# Patient Record
Sex: MALE | Race: WHITE | Employment: FULL TIME | URBAN - METROPOLITAN AREA
[De-identification: names, ages, dates, MRNs, and addresses within clinical notes are randomized per-mention and may not be internally consistent; named-entity substitution may affect disease eponyms.]

---

## 2023-11-06 ENCOUNTER — HOSPITAL ENCOUNTER (INPATIENT)
Facility: HOSPITAL | Age: 23
LOS: 3 days | Discharge: HOME | End: 2023-11-10
Attending: EMERGENCY MEDICINE | Admitting: PSYCHIATRY & NEUROLOGY
Payer: COMMERCIAL

## 2023-11-06 DIAGNOSIS — R45.851 SUICIDAL IDEATION: ICD-10-CM

## 2023-11-06 DIAGNOSIS — T14.91XA SUICIDE ATTEMPT (HCC): Primary | ICD-10-CM

## 2023-11-06 DIAGNOSIS — T50.902A INTENTIONAL DRUG OVERDOSE, INITIAL ENCOUNTER (HCC): ICD-10-CM

## 2023-11-06 LAB
ALBUMIN SERPL-MCNC: 4.3 G/DL (ref 3.4–5)
ALBUMIN/GLOB SERPL: 1.3 (ref 0.8–1.7)
ALP SERPL-CCNC: 82 U/L (ref 45–117)
ALT SERPL-CCNC: 60 U/L (ref 16–61)
ANION GAP SERPL CALC-SCNC: 6 MMOL/L (ref 3–18)
APAP SERPL-MCNC: <2 UG/ML (ref 10–30)
AST SERPL-CCNC: 21 U/L (ref 10–38)
BASOPHILS # BLD: 0 K/UL (ref 0–0.1)
BASOPHILS NFR BLD: 0 % (ref 0–2)
BILIRUB SERPL-MCNC: 0.4 MG/DL (ref 0.2–1)
BUN SERPL-MCNC: 6 MG/DL (ref 7–18)
BUN/CREAT SERPL: 6 (ref 12–20)
CALCIUM SERPL-MCNC: 9.7 MG/DL (ref 8.5–10.1)
CHLORIDE SERPL-SCNC: 107 MMOL/L (ref 100–111)
CO2 SERPL-SCNC: 28 MMOL/L (ref 21–32)
CREAT SERPL-MCNC: 0.96 MG/DL (ref 0.6–1.3)
DIFFERENTIAL METHOD BLD: ABNORMAL
EOSINOPHIL # BLD: 0.2 K/UL (ref 0–0.4)
EOSINOPHIL NFR BLD: 2 % (ref 0–5)
ERYTHROCYTE [DISTWIDTH] IN BLOOD BY AUTOMATED COUNT: 12.6 % (ref 11.6–14.5)
ETHANOL SERPL-MCNC: <3 MG/DL (ref 0–3)
GLOBULIN SER CALC-MCNC: 3.3 G/DL (ref 2–4)
GLUCOSE SERPL-MCNC: 97 MG/DL (ref 74–99)
HCT VFR BLD AUTO: 46.1 % (ref 36–48)
HGB BLD-MCNC: 15.3 G/DL (ref 13–16)
IMM GRANULOCYTES # BLD AUTO: 0 K/UL (ref 0–0.04)
IMM GRANULOCYTES NFR BLD AUTO: 0 % (ref 0–0.5)
LYMPHOCYTES # BLD: 2.6 K/UL (ref 0.9–3.6)
LYMPHOCYTES NFR BLD: 25 % (ref 21–52)
MAGNESIUM SERPL-MCNC: 2.2 MG/DL (ref 1.6–2.6)
MCH RBC QN AUTO: 30.3 PG (ref 24–34)
MCHC RBC AUTO-ENTMCNC: 33.2 G/DL (ref 31–37)
MCV RBC AUTO: 91.3 FL (ref 78–100)
MONOCYTES # BLD: 0.7 K/UL (ref 0.05–1.2)
MONOCYTES NFR BLD: 7 % (ref 3–10)
NEUTS SEG # BLD: 6.7 K/UL (ref 1.8–8)
NEUTS SEG NFR BLD: 66 % (ref 40–73)
NRBC # BLD: 0 K/UL (ref 0–0.01)
NRBC BLD-RTO: 0 PER 100 WBC
PLATELET # BLD AUTO: 275 K/UL (ref 135–420)
PMV BLD AUTO: 8.9 FL (ref 9.2–11.8)
POTASSIUM SERPL-SCNC: 3.5 MMOL/L (ref 3.5–5.5)
PROT SERPL-MCNC: 7.6 G/DL (ref 6.4–8.2)
RBC # BLD AUTO: 5.05 M/UL (ref 4.35–5.65)
SALICYLATES SERPL-MCNC: <1.7 MG/DL (ref 2.8–20)
SODIUM SERPL-SCNC: 141 MMOL/L (ref 136–145)
WBC # BLD AUTO: 10.2 K/UL (ref 4.6–13.2)

## 2023-11-06 PROCEDURE — 82077 ASSAY SPEC XCP UR&BREATH IA: CPT

## 2023-11-06 PROCEDURE — 85025 COMPLETE CBC W/AUTO DIFF WBC: CPT

## 2023-11-06 PROCEDURE — 83735 ASSAY OF MAGNESIUM: CPT

## 2023-11-06 PROCEDURE — 80143 DRUG ASSAY ACETAMINOPHEN: CPT

## 2023-11-06 PROCEDURE — 80053 COMPREHEN METABOLIC PANEL: CPT

## 2023-11-06 PROCEDURE — 99285 EMERGENCY DEPT VISIT HI MDM: CPT

## 2023-11-06 PROCEDURE — 80179 DRUG ASSAY SALICYLATE: CPT

## 2023-11-06 PROCEDURE — 93005 ELECTROCARDIOGRAM TRACING: CPT | Performed by: EMERGENCY MEDICINE

## 2023-11-06 ASSESSMENT — LIFESTYLE VARIABLES
HOW MANY STANDARD DRINKS CONTAINING ALCOHOL DO YOU HAVE ON A TYPICAL DAY: 1 OR 2
HOW OFTEN DO YOU HAVE A DRINK CONTAINING ALCOHOL: MONTHLY OR LESS

## 2023-11-07 PROBLEM — F31.9 BIPOLAR 1 DISORDER, DEPRESSED (HCC): Status: ACTIVE | Noted: 2023-11-07

## 2023-11-07 LAB
AMPHET UR QL SCN: POSITIVE
BARBITURATES UR QL SCN: NEGATIVE
BENZODIAZ UR QL: NEGATIVE
CANNABINOIDS UR QL SCN: NEGATIVE
COCAINE UR QL SCN: NEGATIVE
EKG ATRIAL RATE: 97 BPM
EKG DIAGNOSIS: NORMAL
EKG P AXIS: 51 DEGREES
EKG P-R INTERVAL: 164 MS
EKG Q-T INTERVAL: 376 MS
EKG QRS DURATION: 100 MS
EKG QTC CALCULATION (BAZETT): 477 MS
EKG R AXIS: 41 DEGREES
EKG T AXIS: 24 DEGREES
EKG VENTRICULAR RATE: 97 BPM
FLUAV RNA SPEC QL NAA+PROBE: NOT DETECTED
FLUBV RNA SPEC QL NAA+PROBE: NOT DETECTED
LITHIUM SERPL-SCNC: 0.5 MMOL/L (ref 0.6–1.2)
Lab: ABNORMAL
METHADONE UR QL: NEGATIVE
OPIATES UR QL: NEGATIVE
PCP UR QL: NEGATIVE
SARS-COV-2 RNA RESP QL NAA+PROBE: NOT DETECTED

## 2023-11-07 PROCEDURE — 87636 SARSCOV2 & INF A&B AMP PRB: CPT

## 2023-11-07 PROCEDURE — 80178 ASSAY OF LITHIUM: CPT

## 2023-11-07 PROCEDURE — 6370000000 HC RX 637 (ALT 250 FOR IP): Performed by: PSYCHIATRY & NEUROLOGY

## 2023-11-07 PROCEDURE — 1240000000 HC EMOTIONAL WELLNESS R&B

## 2023-11-07 PROCEDURE — 93010 ELECTROCARDIOGRAM REPORT: CPT | Performed by: INTERNAL MEDICINE

## 2023-11-07 PROCEDURE — 80307 DRUG TEST PRSMV CHEM ANLYZR: CPT

## 2023-11-07 RX ORDER — BUPROPION HYDROCHLORIDE 300 MG/1
300 TABLET ORAL DAILY
Status: DISCONTINUED | OUTPATIENT
Start: 2023-11-08 | End: 2023-11-10 | Stop reason: HOSPADM

## 2023-11-07 RX ORDER — LITHIUM CARBONATE 450 MG
900 TABLET, EXTENDED RELEASE ORAL
Status: DISCONTINUED | OUTPATIENT
Start: 2023-11-07 | End: 2023-11-10 | Stop reason: HOSPADM

## 2023-11-07 RX ORDER — HYDROXYZINE PAMOATE 50 MG/1
50 CAPSULE ORAL EVERY 6 HOURS PRN
Status: DISCONTINUED | OUTPATIENT
Start: 2023-11-07 | End: 2023-11-10 | Stop reason: HOSPADM

## 2023-11-07 RX ADMIN — SERTRALINE 150 MG: 50 TABLET, FILM COATED ORAL at 20:09

## 2023-11-07 RX ADMIN — LITHIUM CARBONATE 900 MG: 450 TABLET, EXTENDED RELEASE ORAL at 20:09

## 2023-11-07 ASSESSMENT — LIFESTYLE VARIABLES
HOW OFTEN DO YOU HAVE A DRINK CONTAINING ALCOHOL: NEVER
HOW MANY STANDARD DRINKS CONTAINING ALCOHOL DO YOU HAVE ON A TYPICAL DAY: PATIENT DOES NOT DRINK

## 2023-11-07 ASSESSMENT — SLEEP AND FATIGUE QUESTIONNAIRES
DO YOU HAVE DIFFICULTY SLEEPING: YES
DO YOU USE A SLEEP AID: YES
AVERAGE NUMBER OF SLEEP HOURS: 6

## 2023-11-07 ASSESSMENT — ENCOUNTER SYMPTOMS
NAUSEA: 0
VOMITING: 0
ABDOMINAL PAIN: 0
SHORTNESS OF BREATH: 0

## 2023-11-07 NOTE — GROUP NOTE
Group Therapy Note    Date: 11/7/2023    Group Start Time: 1500  Group End Time: 4519  Group Topic: Recreational        Bo Slaughter, [address] [place] Therapy Note    Attendees: 5/8      Group Type: Connect More     Focus: Recreational therapist engaged patients in a group game with meaningful conversation. Patients answered  and discussed questions that encouraged them to reflect on their opinions, feelings, thoughts, and goals. This group may help improve social skills, self-reflection, emotional skills, self-control and self-resilience. Notes:  Patient joined group 5 minutes before ending due to being just admitted to unit. Patient briefly introduced hisself to group memebers.       Status After Intervention:  Unchanged      Participation Quality: Appropriate and Sharing      Speech:  normal      Thought Process/Content: Logical      Affective Functioning: Congruent      Mood: euthymic      Level of consciousness:  Alert and Attentive      Endings: None Reported      Modes of Intervention: Socialization and Problem-solving      Recreational Therapist  Steven Buenrostro
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
## 2023-11-07 NOTE — ED PROVIDER NOTES
THIS PATIENT WAS TURNED OVER TO ME BY Dr. Tiffany Borja and was  rounded on at turnover. Pertinent History/Exam prior to turnover: Patient with trazodone overdose. Currently medically cleared and awaiting placement    Working diagnosis (-es) at turnover is/are: Intentional overdose, suicide attempt    Psychiatric History: Prior history of psychiatric concerns, on lithium    Key issues/concerns: Placement for overdose    Potential safety issues/precautions: None    Medically Cleared? Yes     Noted in Chart? Yes    Pending labs/studies: Lithium level has been added    COVID Test ordered? Yes, negative    Psych/CSB consult recommendations: Case management to assist for placement, TDO    RESULTS:  Labs Reviewed   CBC WITH AUTO DIFFERENTIAL - Abnormal; Notable for the following components:       Result Value    MPV 8.9 (*)     All other components within normal limits   COMPREHENSIVE METABOLIC PANEL - Abnormal; Notable for the following components:    BUN 6 (*)     Bun/Cre Ratio 6 (*)     All other components within normal limits   SALICYLATE LEVEL - Abnormal; Notable for the following components:    Salicylate, Serum <8.3 (*)     All other components within normal limits   URINE DRUG SCREEN - Abnormal; Notable for the following components:    Amphetamine, Urine Positive (*)     All other components within normal limits   ACETAMINOPHEN LEVEL - Abnormal; Notable for the following components:    Acetaminophen Level <2 (*)     All other components within normal limits   LITHIUM LEVEL - Abnormal; Notable for the following components:    Lithium 0.50 (*)     All other components within normal limits   COVID-19 & INFLUENZA COMBO   ETHANOL   MAGNESIUM        No results found.     Pertinent ED Course after turnover/Medical Decision Making:  ED Course as of 11/07/23 1828   Mon Nov 06, 2023   2207 EKG 12 Lead  Which was obtained for trazodone overdose, was independently reviewed and interpreted by me showing sinus rhythm at a

## 2023-11-07 NOTE — PLAN OF CARE
Problem: Depression/Self Harm  Goal: Effect of psychiatric condition will be minimized and patient will be protected from self harm  Description: INTERVENTIONS:  1. Assess impact of patient's symptoms on level of functioning, self care needs and offer support as indicated  2. Assess patient/family knowledge of depression, impact on illness and need for teaching  3. Provide emotional support, presence and reassurance  4. Assess for possible suicidal thoughts or ideation. If patient expresses suicidal thoughts or statements do not leave alone, initiate Suicide Precautions, move to a room close to the nursing station and obtain sitter  5.  Initiate consults as appropriate with Mental Health Professional, Spiritual Care, Psychosocial CNS, and consider a recommendation to the LIP for a Psychiatric Consultation  Outcome: Progressing

## 2023-11-07 NOTE — BSMART NOTE
Crisis Note: Crisis discussed case with the on-call psychiatrist, Dr. Latoya Coronel and he recommends pursuing a TDO petition. If CSB does not recommends/supports TDO, patient can be discharged from a psychiatric point of view. Crisis will call [facility] and informed them of the TDO petition and clinicals will be faxed for review.

## 2023-11-07 NOTE — PROGRESS NOTES
Patient received on unit at [phone], via wheelchair, escorted by security, belongings checked by two staff members, pat down search also completed by two staff members, no contraband found on person, valuables  and logged by security, orders released, behavior health assessment completed, patient is alert and oriented x 4,  [facility] notified of patient's admission while on unit assessing other patient,  will  consult for H&P,  pt. eating TV dinner in dayroom, belongings put away, RN will initiate, develop, implement, review or revise treatment plan as needed.

## 2023-11-07 NOTE — BH NOTE
JAIRO Admission  Note: Pt. Arrived on the unit with hospital security and emergency room staff. He was sitting in wheelchair. Pt was checked for contraband upon admission on the unit. Pt was given a copy of the rules upon admission.

## 2023-11-07 NOTE — BSMART NOTE
Crisis note:    Consulted with Dr. Sol Duran. Admit to Dr. Cecilia Perez service  AYAH-1  Room 102-1  Orders received.

## 2023-11-07 NOTE — BSMART NOTE
Crisis note:    Per Rachael Ro of the [facility] CS[facility] patient is voluntary for treatment at this time. Met with patient in the ER. States he is voluntary for treatment. Asked about his cell phone. Wants to try to get someone to take care of his pets while he is in the hospital. Patient attending RN notified patient needs his cell phone to make calls to get someone to care for his pets while in the hospital. Patient also informed he needs to provide a urine drug screen, states unable to void at this time and asked for a soda to drink. Patient given a soda over ice. Patient reports he takes the following medications: Wellbutrin  mg QAM  Adderall ER 40 mg QAM  Lithium Carbonate 900 mg QHS  Trazodone 50 mg QHS  Zoloft 150 mg QHS  Abilify Maintena injections monthly \"the lowest dose\" last received 2-3 weeks ago    Outpatient providers: via [facility].   Armin Hopson NP medications  Cannot recall therapist's name

## 2023-11-07 NOTE — ED TRIAGE NOTES
Pt arrived in ER after taking 8 of trazodone 50 mg with the intention of committing suicide. Pt states he wants to die.

## 2023-11-07 NOTE — BSMART NOTE
Crisis note:    Call placed with [facility] answering service to inquire regarding request for TDO evaluation. Message left with answering service for on call clinician to call crisis. [phone]: Paolo Cerda of the [facility] is aware of TDO eval request. Asked for and received copy of patient ER chart.

## 2023-11-07 NOTE — ED NOTES
Pt prescribed 50mg trazadone ingested 8 denies ETOH; intentional attempt at self harm     Louise Holguin  11/06/23 2131       Louise Holguin  11/06/23 2132
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
## 2023-11-07 NOTE — GROUP NOTE
Group Therapy Note    Date: 11/7/2023    Group Start Time: 3655 Sai , [address] [facility]        Group Therapy Note    Attendees: 3  Group Focus- Defining self esteem and ways to build one's self esteem     Patient was just arriving to the unit during group session.    Discipline Responsible: /Counselor      Signature:  Reg Lawton

## 2023-11-07 NOTE — ED NOTES
Assumed care of patient. Report received. Pt resting at this time VSS at this time.  Will continue to monitor      Anita Frias  11/07/23 3583

## 2023-11-07 NOTE — ED NOTES
Patient father called and has concerns about the patient medication that he is currently taking.  D/t the side effects that is cause per the patient father \" some of the medication that he is taking cause SI\" this nurse will voice concerns to the psychiatrist.     Becky Harris RN  11/07/23 5807

## 2023-11-08 PROBLEM — T43.212A INTENTIONAL OVERDOSE OF TRAZODONE (HCC): Status: ACTIVE | Noted: 2023-11-08

## 2023-11-08 PROBLEM — F31.81 BIPOLAR II DISORDER, SEVERE, DEPRESSED, WITH ANXIOUS DISTRESS (HCC): Chronic | Status: ACTIVE | Noted: 2023-11-07

## 2023-11-08 PROCEDURE — 6370000000 HC RX 637 (ALT 250 FOR IP): Performed by: PSYCHIATRY & NEUROLOGY

## 2023-11-08 PROCEDURE — 99222 1ST HOSP IP/OBS MODERATE 55: CPT | Performed by: PSYCHIATRY & NEUROLOGY

## 2023-11-08 PROCEDURE — 1240000000 HC EMOTIONAL WELLNESS R&B

## 2023-11-08 RX ORDER — ALBUTEROL SULFATE 90 UG/1
2 AEROSOL, METERED RESPIRATORY (INHALATION) EVERY 6 HOURS PRN
Status: DISCONTINUED | OUTPATIENT
Start: 2023-11-08 | End: 2023-11-10 | Stop reason: HOSPADM

## 2023-11-08 RX ADMIN — LITHIUM CARBONATE 900 MG: 450 TABLET, EXTENDED RELEASE ORAL at 20:12

## 2023-11-08 RX ADMIN — BUPROPION HYDROCHLORIDE 300 MG: 300 TABLET, FILM COATED, EXTENDED RELEASE ORAL at 07:44

## 2023-11-08 RX ADMIN — SERTRALINE 200 MG: 50 TABLET, FILM COATED ORAL at 20:11
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
## 2023-11-08 NOTE — BH NOTE
Approximately at 1141 hours, during lunch, patient was observed talking with the charge nurse and patient verbally said to the charge nurse \" I have two rats at my place and sometimes I wrestle with them, and I let them nibble of my fingers because it feels so good\".

## 2023-11-08 NOTE — GROUP NOTE
Group Therapy Note    Date: 11/8/2023    Group Start Time: 1500  Group End Time: 1600  Group Topic: Recreational        Donna Reed, [facility]        Group Therapy Note    Attendees: 4/9      Group Type: Raffi      Group Focus: In this recreational therapy group patients discussed questions on topics of anger, fear, sadness, and lawanda. Group members had meaningful conversations that encouraged them to reflect on their positive and negative emotions and building self-esteem. This group may promote emotional competence, self-awareness/compassion, mood, communications skills, and decrease stress. Notes:  Patient actively engaged in group. When asked patient one thing he wished for, patient stated he wanted to answer but know if he answered truthful he wouldn't be able to discharge Friday. Patient followed saying he wish for forever happiness. Patient expressed feeling content when has love from a woman and having lack of love from mother growing up. Status After Intervention:  Unchanged    Participation Level:  Active Listener and Interactive    Participation Quality: Appropriate, Attentive, and Sharing      Speech:  normal      Thought Process/Content: Logical      Affective Functioning: Congruent      Mood: euthymic      Level of consciousness:  Alert and Attentive        Endings: None Reported      Modes of Intervention: Socialization and Problem-solving      Discipline Responsible: Recreational Therapist      Signature:  Anish Sheehan

## 2023-11-08 NOTE — BSMART NOTE
SOCIAL WORK GROUP THERAPY  NOTE           Date: November 8, 2023, 2023     Group Time:  12:00 PM      Group Ended 12:45      [facility] Special Unit 1     Group Topic: Low Self Esteem/ Part 2     Group members Participation: 2    Patient Refused to Participate in Group session today but was encouraged to Contribute. Group session: When a person has Low Self-Esteem, it can affects how you live your life. If you have low self-esteem, you might be always trying to please other people, friends, and family. You may  feel okay if you keep meeting people standard ways of living. At the center of  low self-esteem are negative opinions you hold about yourself and beliefs. Psychological treatments for low self-esteem are CBT-cognitive behavior therapy, and. COMET-competitive memory training. Desiree Vidal, , MSW.  Supervisee

## 2023-11-08 NOTE — PROGRESS NOTES
1830 :    - I personally called and spoke to nurse Kennedy Abraham and inquired about when the best time would be to come and evaluate the patient with chaperone at bedside.  -Per nurse Kennedy Abraham, she stated to come back around 2000 hrs.     2119 :    - Personally called and spoke to [facility] to inquire if it is now appropriate to come and evaluate the patient was chaperone at bedside.  -Per nurse  [facility] she states that there are staffing shortages and no chaperone is available to accompany us for this evaluation.  -Nurse Yajaira De León states to please come back in the daytime  -I conveyed to nurse Yue Oseguera we will convey this to our day team.    Whitley Vyas MD PGY3

## 2023-11-08 NOTE — PLAN OF CARE
Problem: Anxiety  Goal: Will report anxiety at manageable levels  Description: INTERVENTIONS:  1. Administer medication as ordered  2. Teach and rehearse alternative coping skills  3. Provide emotional support with 1:1 interaction with staff  Outcome: Progressing     Problem: Coping  Goal: Pt/Family able to verbalize concerns and demonstrate effective coping strategies  Description: INTERVENTIONS:  1. Assist patient/family to identify coping skills, available support systems and cultural and spiritual values  2. Provide emotional support, including active listening and acknowledgement of concerns of patient and caregivers  3. Reduce environmental stimuli, as able  4. Instruct patient/family in relaxation techniques, as appropriate  5. Assess for spiritual pain/suffering and initiate Spiritual Care, Psychosocial Clinical Specialist consults as needed  Outcome: Progressing     Problem: Decision Making  Goal: Pt/Family able to effectively weigh alternatives and participate in decision making related to treatment and care  Description: INTERVENTIONS:  1. Determine when there are differences between patient's view, family's view, and healthcare provider's view of condition  2. Facilitate patient and family articulation of goals for care  3. Help patient and family identify pros/cons of alternative solutions  4. Provide information as requested by patient/family  5. Respect patient/family right to receive or not to receive information  6. Serve as a liaison between patient and family and health care team  7. Initiate Consults from Ethics, Palliative Care or initiate [facility] as is appropriate  Outcome: Progressing    Pt presents with bizarre affect, euphoric mood, with linear and goal-directed thought process. Pt has been withdrawn to self on the unit, but does attend select groups. Pt denies SI/HI/AVH. Pt is medication compliant.

## 2023-11-08 NOTE — GROUP NOTE
Group Therapy Note    Date: 11/8/2023    Group Start Time: 1400  Group End Time: 0059  Group Topic: Music Therapy      Raj Brandon        Group Therapy Note    Attendees: 5/9    Group Focus: Music therapy group explored the theme of resiliency through chaitanya analysis of three songs relating to this topic. The group also discussed continuing to try, growth from unlikely places, not comparing oneself to others, and resilience as it relates to mental health. The group may promote self-awareness, self-expression, and use of music as a coping skill. Notes:  Pt reported feeling \"tired\" at the start of group. Pt discussed the lyrics, \"never rained in the desert, til I picked me for the very first time, It's darkest before the sunrise. \" Pt discussed how this could relate to depression, focusing on oneself and giving oneself attention to improve oneself. Pt also related the chaitanya to hitting rock bottom. Pt discussed the importance of taking one's own time as it relates to recovery and not engaging in comparisons to others.     Status After Intervention:  Unchanged    Participation Level: Interactive    Participation Quality: Appropriate, Attentive, and Sharing      Speech:  normal      Thought Process/Content: Logical      Affective Functioning: Congruent      Mood: depressed      Level of consciousness:  Alert and Attentive      Response to Learning: Able to verbalize current knowledge/experience, Able to verbalize/acknowledge new learning, Able to retain information, and Capable of insight      Endings: None Reported    Modes of Intervention: Support, Socialization, Exploration, and Media      Discipline Responsible: /Counselor      Signature:  ALEXA Malagon, [address] [facility] Music Therapist  Licensed Professional Counselor

## 2023-11-08 NOTE — PROGRESS NOTES
Comprehensive Nutrition Assessment    Type and Reason for Visit:  Initial, Positive Nutrition Screen    Nutrition Recommendations/Plan:   Modify current diet- vegetarian with note that pt does not drink milk, okay with dairy in other forms. Monitor PO intake and POC while admitted. Malnutrition Assessment:  Malnutrition Status:  No malnutrition (11/08/23 1116)    Context:  Acute Illness       Nutrition History and Allergies: PMHx: bipolar disorder. Wt hx: c wt- 200 lb (no source), 190 lb (7/1/22, +5.3%), no significant wt loss documented x >1 year, per limited wt hx and current wt with no source. NKFA     Nutrition Assessment:    Pt admitted for management of bipolar 1 d/o. Referral received r/t pt with cultural/Faith/ethnic food preferences- vegetarian noted in comments. Per chart, pt with vegan diet ordered with comments \"pt does not eat meat or drink milk\", will confirm. Spoke to pt on unit, confirmed not eating meat and not drinking milk, okay with dairy in other forms. Denied changes in intake or appetite PTA. Will continue to monitor pt while admitted. Nutrition Related Findings:    No output data or edema documented, per flow sheets. Labs (11/6): reviewed, WNL. Pertinent meds reviewed. Wound Type: None       Current Nutrition Intake & Therapies:    Average Meal Intake: %  Average Supplements Intake: None Ordered  ADULT DIET; Regular; Vegan    Anthropometric Measures:  Height: 195.6 cm (6' 5\")  Ideal Body Weight (IBW): 208 lbs (95 kg)       Current Body Weight: 90.7 kg (200 lb), 96.2 % IBW. Weight Source: Not Specified  Current BMI (kg/m2): 23.7  Usual Body Weight: 86.2 kg (190 lb) (7/1/22)  % Weight Change (Calculated): 5.3  Weight Adjustment For: No Adjustment  BMI Categories: Normal Weight (BMI 18.5-24. 9)    Estimated Daily Nutrient Needs:  Energy Requirements Based On: Formula (MSJ x 1.2-1.4)  Weight Used for Energy Requirements: Current  Energy (kcal/day): 1244 - 9601  Weight Used for Protein Requirements: Current (0.8-1)  Protein (g/day): 73 - 91  Method Used for Fluid Requirements: 1 ml/kcal  Fluid (ml/day): 5037 - 6493    Nutrition Diagnosis:   No nutrition diagnosis at this time     Nutrition Interventions:   Food and/or Nutrient Delivery: Modify Current Diet  Nutrition Education/Counseling: No recommendation at this time  Coordination of Nutrition Care: Continue to monitor while inpatient  Plan of Care discussed with: Pt    Goals:     Goals: Meet at least 75% of estimated needs, by next RD assessment       Nutrition Monitoring and Evaluation:      Food/Nutrient Intake Outcomes: Food and Nutrient Intake  Physical Signs/Symptoms Outcomes: Meal Time Behavior    Discharge Planning:    Continue current diet     Estrellita Fletcher [place]  Contact: [phone]

## 2023-11-08 NOTE — GROUP NOTE
Art Therapy Group Progress Note    PATIENT SCHEDULED FOR GROUP AT:  1:00 PM    GROUP STOP TIME:  1:45 PM     ATTENDANCE: Low (3/8 participants)     TOPIC / FOCUS:  Self-Care Bucket     GOALS:  define self-care, identify ways to practice self-care, define burn out, identify ways that encourage burnout, identify positive coping skills to decrease burnout, increase self-awareness, encourage emotional awareness, promote creativity and creative problem solving, encourage meeting basic self-care goals     PARTICIPATION LEVEL:  Cyndee  Art Therapist, MA    Pt declined to attend group. Pt reported that they had been sleeping all morning and had not yet attended a group. AT encouraged Pt to attend at least one group in the afternoon, while explaining the other groups planned for the rest of the day. PT agreed to attend a group.

## 2023-11-08 NOTE — BH NOTE
2017- pt has been in bed resting with eyes closed. He was compliant with meds. He appears to have some thought blocking/slow to respond. Denied si/hi/avh during time of assessment. Will continue to monitor and support as needed.

## 2023-11-09 PROCEDURE — 6370000000 HC RX 637 (ALT 250 FOR IP): Performed by: PSYCHIATRY & NEUROLOGY

## 2023-11-09 PROCEDURE — 1240000000 HC EMOTIONAL WELLNESS R&B

## 2023-11-09 PROCEDURE — 99231 SBSQ HOSP IP/OBS SF/LOW 25: CPT | Performed by: PSYCHIATRY & NEUROLOGY

## 2023-11-09 RX ADMIN — SERTRALINE 200 MG: 50 TABLET, FILM COATED ORAL at 21:07

## 2023-11-09 RX ADMIN — LITHIUM CARBONATE 900 MG: 450 TABLET, EXTENDED RELEASE ORAL at 21:11

## 2023-11-09 RX ADMIN — BUPROPION HYDROCHLORIDE 300 MG: 300 TABLET, FILM COATED, EXTENDED RELEASE ORAL at 07:44

## 2023-11-09 NOTE — BSMART NOTE
SOCIAL WORK GROUP THERAPY  NOTE           Date: November 9, 20238, 2023, 2023     Group Time:  12:00 PM      Group Ended 12:45      [facility]     Group Topic: Depression     Group members Participation: 2    Patient Actively Participate in Group session today. Depression  Group Session: Depression is a common mental health condition that causes a persistent feeling of sadness and changes in how you think, sleep, eat and act. There are several different types. Depression can be  treatable. Alex Patiño, , MSW.  Supervisee

## 2023-11-09 NOTE — BH NOTE
2011- pt has been isolated to room/bed resting with eyes closed. Pt remains compliant with med and unit policies. Pt reported that he had an \"okay\" day today and \"I just been tired. \"  Pt was educated on hygiene \"I don't really like the scrubs. I will just take a shower in the morning. \"  He denied si/hi/avh during time of assessment. Will continue to monitor and support as needed.
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
## 2023-11-09 NOTE — PROGRESS NOTES
Pt is a 76-year-old female with history of Bipolar Disorder, ADHD, and PTSD. Pt was hospitalized following an overdose of pills. Pt's case was discussed in staffing. Pt  has been attending groups. Nursing staff reports pt is bizarre at times. SW Contact:  SW met with pt to discuss dc planning. Pt expressed to feeling better. SW discussed safety plan and coping strategies ( coping with losses). Pt stated he plans to contact his friend and or therapist if he is not feeling well. Pt stated he like to kayak, play with his pet rats and listen to music. SW discuss suicide prevention hotline. Pt plans to return t [facility] outpt . Pt. Appears cooperative and mood is slowly improving. SW will continue to provide pt with support towards dc planning.          Patricia Hidalgo MA, FRANCKHP-R

## 2023-11-09 NOTE — PROGRESS NOTES
Behavioral Health Progress Note    Admit Date: 11/6/2023  Hospital day 2    Vitals : No data found. Labs:  No results found for this or any previous visit (from the past 24 hour(s)). Meds:   Current Facility-Administered Medications   Medication Dose Route Frequency    albuterol sulfate HFA (PROVENTIL;VENTOLIN;PROAIR) 108 (90 Base) MCG/ACT inhaler 2 puff  2 puff Inhalation Q6H PRN    sertraline (ZOLOFT) tablet 200 mg  200 mg Oral QHS    hydrOXYzine pamoate (VISTARIL) capsule 50 mg  50 mg Oral Q6H PRN    buPROPion (WELLBUTRIN XL) extended release tablet 300 mg  300 mg Oral Daily    lithium (ESKALITH) extended release tablet 900 mg  900 mg Oral QHS      Hospital Problems: Principal Problem:    Bipolar II disorder, severe, depressed, with anxious distress ([address])  Active Problems:    Intentional overdose of trazodone ([address])  Resolved Problems:    * No resolved hospital problems. *      Subjective:   Medication side effects: none      Mental Status Exam  Sensorium: alert  Orientation: only aware of time, place, and person  Relations: cooperative  Eye Contact: appropriate  Appearance: shows no evidence of impairment  Thought Process: slow rate of thoughts, fair abstract reasoning/computation, and logical    Thought Content: no evidence of impairment   Suicidal: denies   Homicidal: none   Mood: is euthymic   Affect:  constricted  Memory: shows no evidence of impairment     Concentration: distractable  Abstraction: concrete  Insight: The patient's insight shows no evidence of impairment    OR Fair  Judgement: is psychologically impaired OR  Fair    Assessment/Plan:   improved    Continue close observation,    Nurses report that the patient continues to be somewhat odd. He stays in his room much of the time. He denies homicidal or suicidal ideas. He denies hallucinations or delusions. He denies medication complaints.   He does say that he needs to see his therapist at least weekly when he gets out of the hospital.  He denies problems with the current dosing of his antidepressant 200 mg. We will continue medications as is and if he continues to improve I would anticipate we will probably discharge tomorrow with outpatient follow-up.

## 2023-11-09 NOTE — PLAN OF CARE
Problem: Anxiety  Goal: Will report anxiety at manageable levels  Description: INTERVENTIONS:  1. Administer medication as ordered  2. Teach and rehearse alternative coping skills  3. Provide emotional support with 1:1 interaction with staff  Outcome: Progressing     Problem: Coping  Goal: Pt/Family able to verbalize concerns and demonstrate effective coping strategies  Description: INTERVENTIONS:  1. Assist patient/family to identify coping skills, available support systems and cultural and spiritual values  2. Provide emotional support, including active listening and acknowledgement of concerns of patient and caregivers  3. Reduce environmental stimuli, as able  4. Instruct patient/family in relaxation techniques, as appropriate  5. Assess for spiritual pain/suffering and initiate Spiritual Care, Psychosocial Clinical Specialist consults as needed  Outcome: Progressing     Problem: Decision Making  Goal: Pt/Family able to effectively weigh alternatives and participate in decision making related to treatment and care  Description: INTERVENTIONS:  1. Determine when there are differences between patient's view, family's view, and healthcare provider's view of condition  2. Facilitate patient and family articulation of goals for care  3. Help patient and family identify pros/cons of alternative solutions  4. Provide information as requested by patient/family  5. Respect patient/family right to receive or not to receive information  6. Serve as a liaison between patient and family and health care team  7. Initiate Consults from Ethics, Palliative Care or initiate [address] as is appropriate  Outcome: Progressing     Pt presents with bizarre affect, depressed mood, preoccupied thought process, poor judgment. Pt has been withdrawn to self on the unit, but does attend select groups. Pt stated to staff, \"I have something to say, but if I tell you they probably won't let me go home. \" Pt refused to elaborate

## 2023-11-09 NOTE — GROUP NOTE
Group Therapy Note    Date: 11/9/2023    Group Start Time: 0930  Group End Time: 8623  Group Topic: Music Therapy      Gladis Dunham        Group Therapy Note    Attendees: 3/8    Group Focus: Music therapy group utilized the interventions of chaitanya analysis and patient preferred music where patients listened to and discussed songs that are personally meaningful to them. Group members discussed what a free mind means to them and what helps to have a free mind. Group members also discussed past life experiences, working on improving their mental health, and goals for the future. The group may promote self-expression, self-awareness, and use of music as a coping skill. Notes: Pt did not attend music therapy group.         Discipline Responsible: /Counselor      Signature:  ALEXA Sosa, [facility] Music Therapist  Licensed Professional Counselor

## 2023-11-10 VITALS
BODY MASS INDEX: 23.62 KG/M2 | RESPIRATION RATE: 20 BRPM | WEIGHT: 200 LBS | TEMPERATURE: 97.9 F | DIASTOLIC BLOOD PRESSURE: 81 MMHG | HEIGHT: 77 IN | SYSTOLIC BLOOD PRESSURE: 147 MMHG | OXYGEN SATURATION: 100 % | HEART RATE: 85 BPM

## 2023-11-10 PROCEDURE — 99238 HOSP IP/OBS DSCHRG MGMT 30/<: CPT | Performed by: PSYCHIATRY & NEUROLOGY

## 2023-11-10 PROCEDURE — 6370000000 HC RX 637 (ALT 250 FOR IP): Performed by: PSYCHIATRY & NEUROLOGY

## 2023-11-10 RX ORDER — LITHIUM CARBONATE 450 MG
900 TABLET, EXTENDED RELEASE ORAL
Qty: 1 TABLET | Refills: 0
Start: 2023-11-10

## 2023-11-10 RX ORDER — BUPROPION HYDROCHLORIDE 300 MG/1
300 TABLET ORAL DAILY
Qty: 30 TABLET | Refills: 3 | Status: SHIPPED | OUTPATIENT
Start: 2023-11-11

## 2023-11-10 RX ORDER — SERTRALINE HYDROCHLORIDE 100 MG/1
200 TABLET, FILM COATED ORAL
Qty: 30 TABLET | Refills: 1 | Status: SHIPPED | OUTPATIENT
Start: 2023-11-10

## 2023-11-10 RX ADMIN — BUPROPION HYDROCHLORIDE 300 MG: 300 TABLET, FILM COATED, EXTENDED RELEASE ORAL at 07:41

## 2023-11-10 NOTE — BSMART NOTE
CRISIS NOTE: Received a call from Park Al from [place] office, indicating she looked in [address] [place] [address] 15 and saw Patient was scheduled to discharge. She advised Patient has been seeing Corky Montanez NP for medication management and requested a call to make his appointment prior to discharge at [phone], and fax discharge report to [phone]. Caller made aware Patient would have to sign a WAGNER, and she indicated her understanding. Elisha Wang, LESLIE, CSAC, CADC  [place] Counselor

## 2023-11-10 NOTE — PROGRESS NOTES
SOCIAL WORK GROUP THERAPY PROGRESS NOTE    Group Time:  10:15am    Group Topic:  Coping Skills    Group Participation:     Pt was unavailable for group due to working with nursing staff preparing for d/c. Also was not interested in CBT handouts.

## 2023-11-10 NOTE — GROUP NOTE
Art Therapy Group Progress Note    PATIENT SCHEDULED FOR GROUP AT:  9:30 AM     GROUP STOP TIME:  10:15 AM     ATTENDANCE: Moderate (5/9 participants)     TOPIC / FOCUS: Patterned Mandala     GOALS: define mindfulness, practice deep breathing, promote concentration, disrupt negative thinking, increase autonomy, encourage effective coping skills    PARTICIPATION LEVEL:  Jenna Gil  Art Therapist, MA     Pt laying in bed when AT encourage PT to participate in art group. Pt stated he might come out to participate. PT was again encouraged to participate in group by a MHT doing rounds. Pt entered day at group completion.

## 2023-11-10 NOTE — PLAN OF CARE
Problem: Anxiety  Goal: Will report anxiety at manageable levels  Description: INTERVENTIONS:  1. Administer medication as ordered  2. Teach and rehearse alternative coping skills  3. Provide emotional support with 1:1 interaction with staff  Outcome: Progressing     Problem: Coping  Goal: Pt/Family able to verbalize concerns and demonstrate effective coping strategies  Description: INTERVENTIONS:  1. Assist patient/family to identify coping skills, available support systems and cultural and spiritual values  2. Provide emotional support, including active listening and acknowledgement of concerns of patient and caregivers  3. Reduce environmental stimuli, as able  4. Instruct patient/family in relaxation techniques, as appropriate  5. Assess for spiritual pain/suffering and initiate Spiritual Care, Psychosocial Clinical Specialist consults as needed  Outcome: Progressing     Problem: Decision Making  Goal: Pt/Family able to effectively weigh alternatives and participate in decision making related to treatment and care  Description: INTERVENTIONS:  1. Determine when there are differences between patient's view, family's view, and healthcare provider's view of condition  2. Facilitate patient and family articulation of goals for care  3. Help patient and family identify pros/cons of alternative solutions  4. Provide information as requested by patient/family  5. Respect patient/family right to receive or not to receive information  6. Serve as a liaison between patient and family and health care team  7. Initiate Consults from Ethics, Palliative Care or initiate [address] as is appropriate  Outcome: Progressing     Problem: Behavior  Goal: Pt/Family maintain appropriate behavior and adhere to behavioral management agreement, if implemented  Description: INTERVENTIONS:  1. Assess patient/family's coping skills and  non-compliant behavior (including use of illegal substances)  2.  Notify security of

## 2023-11-10 NOTE — GROUP NOTE
Group Therapy Note    Date: 11/10/2023    Group Start Time: 1300  Group End Time: 0209  Group Topic: Music Therapy      Alecia Severin        Group Therapy Note    Attendees: 4/7    Group Focus: Music therapy group consisted of collaborative music making with a combination of group singing and instrument playing. The group may promote improved mood, energy, sense of self-efficacy, present-centered focus, and use of music as a coping skill. Notes:  Pt actively engaged in the group until he left early due to discharging. Pt tried more than one instrument in group and discussed interest in learning to play instruments, seeming open to exploring use of music as a coping skill.     Status After Intervention:  Unchanged    Participation Level: Interactive    Participation Quality: Appropriate, Attentive, and Sharing      Speech:  normal      Thought Process/Content: Logical      Affective Functioning: Congruent      Mood: euthymic      Level of consciousness:  Alert and Attentive      Response to Learning: Able to verbalize current knowledge/experience      Endings: None Reported    Modes of Intervention: Support, Socialization, Exploration, and Media      Discipline Responsible: /Counselor      Signature:  Alecia Severin, MT-BC, [address] [facility] Music Therapist  Licensed Professional Counselor
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
## 2023-11-10 NOTE — PROGRESS NOTES
Pt is a 26-year-old female with history of Bipolar Disorder, ADHD, and PTSD. Pt was hospitalized following an overdose of pills. SW Contact/DC Plan:  SW met with pt to discuss dc plan. SW had pt to reflect on safety plan and continued coping strategies. Pt denies ideations and hallucinations. Pt plans to follow-up with therapy and medications management at [address]  [facility]   [phone]. SW could not make an aftercare appointment for the pt because, pt has to pay current bill. Pt plans to return to his home address. Pt is encouraged to follow-up with therapy and medications management at   [address] [facility]   [phone]. And or [facility] for therapy   [address] [place] [address]. [address]  [phone]. Pt is encouraged to text  or call [phone] provides 24/7, free and confidential support.     Monica Hill MA, LMHP-R

## 2023-11-10 NOTE — DISCHARGE INSTRUCTIONS
Pt is encouraged to follow-up with therapy and medications management at   [facility] [place]    [address] [facility]   [phone]. And or     [facility] for therapy   [facility].   [address]  [phone]    Pt is encouraged to text  or call [phone] provides 24/7, free and confidential support for people

## 2023-11-10 NOTE — PLAN OF CARE
Problem: Coping  Goal: Pt/Family able to verbalize concerns and demonstrate effective coping strategies  Description: INTERVENTIONS:  1. Assist patient/family to identify coping skills, available support systems and cultural and spiritual values  2. Provide emotional support, including active listening and acknowledgement of concerns of patient and caregivers  3. Reduce environmental stimuli, as able  4. Instruct patient/family in relaxation techniques, as appropriate  5. Assess for spiritual pain/suffering and initiate Spiritual Care, Psychosocial Clinical Specialist consults as needed  11/10/2023 0617 by Stacia Fowler RN  Outcome: Progressing     Problem: Depression/Self Harm  Goal: Effect of psychiatric condition will be minimized and patient will be protected from self harm  Description: INTERVENTIONS:  1. Assess impact of patient's symptoms on level of functioning, self care needs and offer support as indicated  2. Assess patient/family knowledge of depression, impact on illness and need for teaching  3. Provide emotional support, presence and reassurance  4. Assess for possible suicidal thoughts or ideation. If patient expresses suicidal thoughts or statements do not leave alone, initiate Suicide Precautions, move to a room close to the nursing station and obtain sitter  5.  Initiate consults as appropriate with Mental Health Professional, Spiritual Care, Psychosocial CNS, and consider a recommendation to the LIP for a Psychiatric Consultation  11/10/2023 1242 by Bren Renteria RN  Outcome: Progressing  11/10/2023 0617 by Stacia Fowler RN  Outcome: Progressing

## 2023-11-10 NOTE — BH NOTE
Received discharge orders for discharge from hospital, patient in good condition, all paperwork signed and reviewed, copy and prescriptions given to patient, patient belongings verified by patient,  safety plan completed, patient escorted by [facility] staff to lobby to meet friend for transportation to home

## 2023-11-10 NOTE — PROGRESS NOTES
GROUP THERAPY PROGRESS NOTE      Belle Reddy was present for medication group. GROUP TIME: 15 minutes, Thursday 2pm    PERSONAL GOAL FOR PARTICIPATION: To be present for group, participate in discussion, and answer patient-directed questions. GOAL ORIENTATION: Personal    THERAPEUTIC INTERVENTIONS REVIEWED AND DISCUSSED: The following topics were presented: Therapeutic Interventions: storage of medications, how to remember to refill medications and keep up with doctor appointments, relapse prevention, keeping a record of all medication including prescription and non-prescription drugs, and who to contact with medication questions. Patients were given time to ask questions regarding their current therapy. IMPRESSION OF PARTICIPATION:    Patient attended session and was attentive and engaged.     Bryn Medina, [address] [facility]